# Patient Record
Sex: MALE | Race: ASIAN
[De-identification: names, ages, dates, MRNs, and addresses within clinical notes are randomized per-mention and may not be internally consistent; named-entity substitution may affect disease eponyms.]

---

## 2019-09-28 ENCOUNTER — HOSPITAL ENCOUNTER (EMERGENCY)
Dept: HOSPITAL 61 - ER | Age: 24
Discharge: HOME | End: 2019-09-28
Payer: SELF-PAY

## 2019-09-28 VITALS — BODY MASS INDEX: 24.16 KG/M2 | HEIGHT: 65 IN | WEIGHT: 145 LBS

## 2019-09-28 VITALS — DIASTOLIC BLOOD PRESSURE: 68 MMHG | SYSTOLIC BLOOD PRESSURE: 118 MMHG
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
## 2019-09-28 DIAGNOSIS — S31.815A: Primary | ICD-10-CM
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
## 2019-09-28 PROCEDURE — 99283 EMERGENCY DEPT VISIT LOW MDM: CPT

## 2019-09-28 NOTE — PHYS DOC
Past Medical History


Past Medical History:  No Pertinent History


Past Surgical History:  No Surgical History


Alcohol Use:  None


Drug Use:  None





Adult General


Chief Complaint


Chief Complaint:  ANIMAL BITE





HPI


HPI





Patient is a 24  year old male who presents with at 1900 tonight was a bit in 

the right buttock by a [place] Romero. Patient states he is up-to-date on 

tetanus shot. Patient states that the dog is fully vaccinated.





Review of Systems


Review of Systems








Integument: Dog bite to right buttock. Denies rash or skin lesions []








All other systems were reviewed and found to be within normal limits, except as 

documented in this note.





Allergies


Allergies





Allergies








Coded Allergies Type Severity Reaction Last Updated Verified


 


  No Known Drug Allergies    9/28/19 No











Physical Exam


Physical Exam





Constitutional: Well developed, well nourished, no acute distress, non-toxic 

appearance. []


Skin: Right but talks dog bite. (See note ). Warm, dry, no erythema, no rash. []

 


Extremities: No tenderness, no cyanosis, no clubbing, ROM intact, no edema. [] 


Neurologic: Alert and oriented X 3, normal motor function, normal sensory 

function, no focal deficits noted. []


Psychologic: Affect normal, judgement normal, mood normal. []





Current Patient Data


Vital Signs





                                   Vital Signs








  Date Time  Temp Pulse Resp B/P (MAP) Pulse Ox O2 Delivery O2 Flow Rate FiO2


 


9/28/19 21:10 98.8 78 18 118/68 (85) 96 Room Air  





 98.8       











EKG


EKG


[]





Radiology/Procedures


Radiology/Procedures


[]





Course & Med Decision Making


Course & Med Decision Making


Patient is a 24  year old male who presents with at 1900 tonight was a bit in 

the right buttock by a [place] Romero. Patient states he is up-to-date on 

tetanus shot. Patient states that the dog is fully vaccinated. Patient rates 

pain around a 2 out of 10. And dry. Alert and oriented. Ambulatory with a steady

 gait. There are 3 separate tooth abrasions to the right buttock. Bleeding is 

controlled. There is no redness or signs of infection. There is no swelling or 

tenderness. Patient is told that he does have a primary care that he can return 

here for 8 hours for a wound recheck. I also educated him on signs of infection.

 Patient is told to take the antibiotic fully and to keep the area clean.





The area is cleaned with chlorhexidine and covered with gauze.





Dragon Disclaimer


Dragon Disclaimer


This electronic medical record was generated, in whole or in part, using a voice

 recognition dictation system.





Departure


Departure


Impression:  


   Primary Impression:  


   Animal bite


Disposition:  01 HOME, SELF-CARE


Condition:  STABLE


Referrals:  


NO PCP (PCP)


Patient Instructions:  Animal Bite





Additional Instructions:  


Watch for signs of infections such as increased redness, drainage, increased 

pain, fever, swelling. Take antibiotic until it is gone. Follow up with a 

primary care doctor or he can return here for a wound recheck.


Scripts


Amoxicillin/Potassium Clav (AMOX TR-K -125 MG TAB) 1 Each Tablet


1 TAB PO BID, #20 TAB


   Prov: GRIFFIN ESPINOSA         9/28/19











GRIFFIN ESPINOSA            Sep 28, 2019 22:03